# Patient Record
Sex: FEMALE | Race: WHITE | NOT HISPANIC OR LATINO | Employment: FULL TIME | URBAN - METROPOLITAN AREA
[De-identification: names, ages, dates, MRNs, and addresses within clinical notes are randomized per-mention and may not be internally consistent; named-entity substitution may affect disease eponyms.]

---

## 2020-07-02 LAB — PAP RECOMMENDATION EXT: NORMAL

## 2022-04-10 ENCOUNTER — HISTORICAL (OUTPATIENT)
Dept: ADMINISTRATIVE | Facility: HOSPITAL | Age: 27
End: 2022-04-10

## 2022-04-30 VITALS
SYSTOLIC BLOOD PRESSURE: 98 MMHG | DIASTOLIC BLOOD PRESSURE: 56 MMHG | HEIGHT: 62 IN | WEIGHT: 134 LBS | BODY MASS INDEX: 24.66 KG/M2

## 2022-11-23 ENCOUNTER — TELEPHONE (OUTPATIENT)
Dept: ENDOSCOPY | Facility: HOSPITAL | Age: 27
End: 2022-11-23
Payer: MEDICAID

## 2022-11-23 VITALS — BODY MASS INDEX: 23.55 KG/M2 | WEIGHT: 128 LBS | HEIGHT: 62 IN

## 2022-11-23 DIAGNOSIS — R10.9 ABDOMINAL PAIN, UNSPECIFIED ABDOMINAL LOCATION: Primary | ICD-10-CM

## 2022-11-23 RX ORDER — FAMOTIDINE 20 MG/1
20 TABLET, FILM COATED ORAL 2 TIMES DAILY
COMMUNITY
End: 2023-07-25

## 2022-11-23 RX ORDER — AMITRIPTYLINE HYDROCHLORIDE 25 MG/1
25 TABLET, FILM COATED ORAL NIGHTLY PRN
COMMUNITY
End: 2023-07-25

## 2022-11-23 RX ORDER — LISDEXAMFETAMINE DIMESYLATE 70 MG/1
70 CAPSULE ORAL EVERY MORNING
COMMUNITY

## 2022-11-23 RX ORDER — PANTOPRAZOLE SODIUM 40 MG/1
40 TABLET, DELAYED RELEASE ORAL DAILY
COMMUNITY
End: 2023-07-25

## 2022-11-23 RX ORDER — SUCRALFATE 1 G/1
1 TABLET ORAL 4 TIMES DAILY
COMMUNITY
End: 2023-07-25

## 2022-11-23 NOTE — PLAN OF CARE
Contacted patient in regards to scheduling esophageal manometry procedure. No answer. Left  voicemail message for patient to call the endoscopy scheduling department at [phone].

## 2022-11-23 NOTE — PLAN OF CARE
Endoscopy procedure scheduled on 12/12/22, prep instructions reviewed, Pt verbalized understanding. Prep instructions emailed to [email or web address].

## 2022-12-02 ENCOUNTER — TELEPHONE (OUTPATIENT)
Dept: ENDOSCOPY | Facility: HOSPITAL | Age: 27
End: 2022-12-02
Payer: MEDICAID

## 2022-12-02 NOTE — TELEPHONE ENCOUNTER
Spoke with Dr. Jhon Rosen's nurse Adeline at [facility]. Per Dr. Rosen patient to remain on PPI for esophageal manometry with 24 hr pH testing procedure.

## 2022-12-12 ENCOUNTER — HOSPITAL ENCOUNTER (OUTPATIENT)
Facility: HOSPITAL | Age: 27
Discharge: HOME | End: 2022-12-12
Attending: INTERNAL MEDICINE | Admitting: INTERNAL MEDICINE
Payer: MEDICAID

## 2022-12-12 VITALS
HEIGHT: 62 IN | HEART RATE: 62 BPM | RESPIRATION RATE: 15 BRPM | OXYGEN SATURATION: 100 % | TEMPERATURE: 98 F | SYSTOLIC BLOOD PRESSURE: 100 MMHG | DIASTOLIC BLOOD PRESSURE: 62 MMHG | BODY MASS INDEX: 23.55 KG/M2 | WEIGHT: 128 LBS

## 2022-12-12 DIAGNOSIS — R10.9 ABDOMINAL PAIN: ICD-10-CM

## 2022-12-12 PROCEDURE — 91038 ESOPH IMPED FUNCT TEST > 1HR: CPT | Mod: TC | Performed by: INTERNAL MEDICINE

## 2022-12-12 PROCEDURE — 25000003 PHARM REV CODE 250: Performed by: INTERNAL MEDICINE

## 2022-12-12 PROCEDURE — 91010 ESOPHAGUS MOTILITY STUDY: CPT | Mod: TC | Performed by: INTERNAL MEDICINE

## 2022-12-12 RX ORDER — LIDOCAINE HYDROCHLORIDE 20 MG/ML
JELLY TOPICAL ONCE
Status: COMPLETED | OUTPATIENT
Start: 2022-12-12 | End: 2022-12-12

## 2022-12-12 RX ADMIN — LIDOCAINE HYDROCHLORIDE 10 ML: 20 JELLY TOPICAL at 07:12

## 2022-12-12 NOTE — PLAN OF CARE
Pt tolerated procedure well. Full protocol folowed with 200 cc NS bolus.   24 hour impedance catheter placed at 33cm at 0837. Pt tolerated well and verbalized understanding of instructions.   Pt verbalized understanding of AVS

## 2022-12-15 ENCOUNTER — TELEPHONE (OUTPATIENT)
Dept: GASTROENTEROLOGY | Facility: CLINIC | Age: 27
End: 2022-12-15
Payer: MEDICAID

## 2022-12-15 PROCEDURE — 91010 PR ESOPHAGEAL MOTILITY STUDY, MA2METRY: ICD-10-PCS | Mod: 26,,, | Performed by: INTERNAL MEDICINE

## 2022-12-15 PROCEDURE — 91038 ESOPH IMPED FUNCT TEST > 1HR: CPT | Mod: 26,,, | Performed by: INTERNAL MEDICINE

## 2022-12-15 PROCEDURE — 91010 ESOPHAGUS MOTILITY STUDY: CPT | Mod: 26,,, | Performed by: INTERNAL MEDICINE

## 2022-12-15 PROCEDURE — 91038 PR GERD TST W/ MUCOS IMPEDE ELECTROD,>1HR: ICD-10-PCS | Mod: 26,,, | Performed by: INTERNAL MEDICINE

## 2022-12-15 NOTE — PROVATION PATIENT INSTRUCTIONS
Discharge Summary/Instructions after an Endoscopic Procedure  Patient Name: Mena Canas  Patient MRN: [id number]  Patient YOB: 1995  Thursday, December 15, 2022  Christen Velasquez MD  Dear patient,  As a result of recent federal legislation (The Federal Cures Act), you may   receive lab or pathology results from your procedure in your [facility]   account before your physician is able to contact you. Your physician or   their representative will relay the results to you with their   recommendations at their soonest availability.  Thank you,  RESTRICTIONS:  During your procedure today, you received medications for sedation.  These   medications may affect your judgment, balance and coordination.  Therefore,   for 24 hours, you have the following restrictions:   - DO NOT drive a car, operate machinery, make legal/financial decisions,   sign important papers or drink alcohol.    ACTIVITY:  Today: no heavy lifting, straining or running due to procedural   sedation/anesthesia.  The following day: return to full activity including work.  DIET:  Eat and drink normally unless instructed otherwise.     TREATMENT FOR COMMON SIDE EFFECTS:  - Mild abdominal pain, nausea, belching, bloating or excessive gas:  rest,   eat lightly and use a heating pad.  - Sore Throat: treat with throat lozenges and/or gargle with warm salt   water.  - Because air was used during the procedure, expelling large amounts of air   from your rectum or belching is normal.  - If a bowel prep was taken, you may not have a bowel movement for 1-3 days.    This is normal.  SYMPTOMS TO WATCH FOR AND REPORT TO YOUR PHYSICIAN:  1. Abdominal pain or bloating, other than gas cramps.  2. Chest pain.  3. Back pain.  4. Signs of infection such as: chills or fever occurring within 24 hours   after the procedure.  5. Rectal bleeding, which would show as bright red, maroon, or black stools.   (A tablespoon of blood from the rectum is not serious, especially  if   hemorrhoids are present.)  6. Vomiting.  7. Weakness or dizziness.  GO DIRECTLY TO THE NEAREST EMERGENCY ROOM IF YOU HAVE ANY OF THE FOLLOWING:      Difficulty breathing              Chills and/or fever over 101 F   Persistent vomiting and/or vomiting blood   Severe abdominal pain   Severe chest pain   Black, tarry stools   Bleeding- more than one tablespoon   Any other symptom or condition that you feel may need urgent attention  Your doctor recommends these additional instructions:  If any biopsies were taken, your doctors clinic will contact you in 1 to 2   weeks with any results.  - Return to referring physician as previously scheduled.  For questions, problems or results please call your physician - Christen Velasquez MD at Work:  [phone].  [facility], EMERGENCY ROOM PHONE NUMBER: [phone]  IF A COMPLICATION OR EMERGENCY SITUATION ARISES AND YOU ARE UNABLE TO REACH   YOUR PHYSICIAN - GO DIRECTLY TO THE EMERGENCY ROOM.  Christen Velasquez MD  12/15/2022 5:37:23 PM  This report has been verified and signed electronically.  Dear patient,  As a result of recent federal legislation (The Federal Cures Act), you may   receive lab or pathology results from your procedure in your [facility]   account before your physician is able to contact you. Your physician or   their representative will relay the results to you with their   recommendations at their soonest availability.  Thank you,  PROVATION

## 2022-12-15 NOTE — PROVATION PATIENT INSTRUCTIONS
Discharge Summary/Instructions after an Endoscopic Procedure  Patient Name: Mena Canas  Patient MRN: [id number]  Patient YOB: 1995  Thursday, December 15, 2022  Christen Velasquez MD  Dear patient,  As a result of recent federal legislation (The Federal Cures Act), you may   receive lab or pathology results from your procedure in your [facility]   account before your physician is able to contact you. Your physician or   their representative will relay the results to you with their   recommendations at their soonest availability.  Thank you,  RESTRICTIONS:  During your procedure today, you received medications for sedation.  These   medications may affect your judgment, balance and coordination.  Therefore,   for 24 hours, you have the following restrictions:   - DO NOT drive a car, operate machinery, make legal/financial decisions,   sign important papers or drink alcohol.    ACTIVITY:  Today: no heavy lifting, straining or running due to procedural   sedation/anesthesia.  The following day: return to full activity including work.  DIET:  Eat and drink normally unless instructed otherwise.     TREATMENT FOR COMMON SIDE EFFECTS:  - Mild abdominal pain, nausea, belching, bloating or excessive gas:  rest,   eat lightly and use a heating pad.  - Sore Throat: treat with throat lozenges and/or gargle with warm salt   water.  - Because air was used during the procedure, expelling large amounts of air   from your rectum or belching is normal.  - If a bowel prep was taken, you may not have a bowel movement for 1-3 days.    This is normal.  SYMPTOMS TO WATCH FOR AND REPORT TO YOUR PHYSICIAN:  1. Abdominal pain or bloating, other than gas cramps.  2. Chest pain.  3. Back pain.  4. Signs of infection such as: chills or fever occurring within 24 hours   after the procedure.  5. Rectal bleeding, which would show as bright red, maroon, or black stools.   (A tablespoon of blood from the rectum is not serious, especially  if   hemorrhoids are present.)  6. Vomiting.  7. Weakness or dizziness.  GO DIRECTLY TO THE NEAREST EMERGENCY ROOM IF YOU HAVE ANY OF THE FOLLOWING:      Difficulty breathing              Chills and/or fever over 101 F   Persistent vomiting and/or vomiting blood   Severe abdominal pain   Severe chest pain   Black, tarry stools   Bleeding- more than one tablespoon   Any other symptom or condition that you feel may need urgent attention  Your doctor recommends these additional instructions:  If any biopsies were taken, your doctors clinic will contact you in 1 to 2   weeks with any results.  - Return to referring physician as previously scheduled.  For questions, problems or results please call your physician - Christen Velasquez MD at Work:  [phone].  [facility], EMERGENCY ROOM PHONE NUMBER: [phone]  IF A COMPLICATION OR EMERGENCY SITUATION ARISES AND YOU ARE UNABLE TO REACH   YOUR PHYSICIAN - GO DIRECTLY TO THE EMERGENCY ROOM.  Christen Velasquez MD  12/15/2022 5:26:07 PM  This report has been verified and signed electronically.  Dear patient,  As a result of recent federal legislation (The Federal Cures Act), you may   receive lab or pathology results from your procedure in your [facility]   account before your physician is able to contact you. Your physician or   their representative will relay the results to you with their   recommendations at their soonest availability.  Thank you,  PROVATION

## 2023-01-24 ENCOUNTER — DOCUMENTATION ONLY (OUTPATIENT)
Dept: ADMINISTRATIVE | Facility: HOSPITAL | Age: 28
End: 2023-01-24
Payer: MEDICAID

## 2023-07-25 ENCOUNTER — OFFICE VISIT (OUTPATIENT)
Dept: OBSTETRICS AND GYNECOLOGY | Facility: CLINIC | Age: 28
End: 2023-07-25
Payer: MEDICAID

## 2023-07-25 VITALS
HEIGHT: 62 IN | WEIGHT: 126 LBS | BODY MASS INDEX: 23.19 KG/M2 | HEART RATE: 88 BPM | DIASTOLIC BLOOD PRESSURE: 60 MMHG | SYSTOLIC BLOOD PRESSURE: 110 MMHG

## 2023-07-25 DIAGNOSIS — R10.2 PELVIC PAIN: ICD-10-CM

## 2023-07-25 DIAGNOSIS — Z01.419 WOMEN'S ANNUAL ROUTINE GYNECOLOGICAL EXAMINATION: Primary | ICD-10-CM

## 2023-07-25 PROCEDURE — 1160F RVW MEDS BY RX/DR IN RCRD: CPT | Mod: CPTII,,, | Performed by: NURSE PRACTITIONER

## 2023-07-25 PROCEDURE — 3074F PR MOST RECENT SYSTOLIC BLOOD PRESSURE < 130 MM HG: ICD-10-PCS | Mod: CPTII,,, | Performed by: NURSE PRACTITIONER

## 2023-07-25 PROCEDURE — 3008F PR BODY MASS INDEX (BMI) DOCUMENTED: ICD-10-PCS | Mod: CPTII,,, | Performed by: NURSE PRACTITIONER

## 2023-07-25 PROCEDURE — 3008F BODY MASS INDEX DOCD: CPT | Mod: CPTII,,, | Performed by: NURSE PRACTITIONER

## 2023-07-25 PROCEDURE — 3078F PR MOST RECENT DIASTOLIC BLOOD PRESSURE < 80 MM HG: ICD-10-PCS | Mod: CPTII,,, | Performed by: NURSE PRACTITIONER

## 2023-07-25 PROCEDURE — 3074F SYST BP LT 130 MM HG: CPT | Mod: CPTII,,, | Performed by: NURSE PRACTITIONER

## 2023-07-25 PROCEDURE — 1159F PR MEDICATION LIST DOCUMENTED IN MEDICAL RECORD: ICD-10-PCS | Mod: CPTII,,, | Performed by: NURSE PRACTITIONER

## 2023-07-25 PROCEDURE — 99395 PR PREVENTIVE VISIT,EST,18-39: ICD-10-PCS | Mod: ,,, | Performed by: NURSE PRACTITIONER

## 2023-07-25 PROCEDURE — 1159F MED LIST DOCD IN RCRD: CPT | Mod: CPTII,,, | Performed by: NURSE PRACTITIONER

## 2023-07-25 PROCEDURE — 1160F PR REVIEW ALL MEDS BY PRESCRIBER/CLIN PHARMACIST DOCUMENTED: ICD-10-PCS | Mod: CPTII,,, | Performed by: NURSE PRACTITIONER

## 2023-07-25 PROCEDURE — 3078F DIAST BP <80 MM HG: CPT | Mod: CPTII,,, | Performed by: NURSE PRACTITIONER

## 2023-07-25 PROCEDURE — 99395 PREV VISIT EST AGE 18-39: CPT | Mod: ,,, | Performed by: NURSE PRACTITIONER

## 2023-07-25 RX ORDER — ONDANSETRON 8 MG/1
TABLET, ORALLY DISINTEGRATING ORAL
COMMUNITY
Start: 2023-06-08

## 2023-07-25 RX ORDER — RIZATRIPTAN BENZOATE 10 MG/1
TABLET ORAL
COMMUNITY
Start: 2023-06-08

## 2023-07-25 RX ORDER — PROMETHAZINE HYDROCHLORIDE 25 MG/1
25 TABLET ORAL 2 TIMES DAILY PRN
COMMUNITY
Start: 2023-07-11

## 2023-07-25 NOTE — PROGRESS NOTES
Patient ID: [id number]   Chief Complaint: Annual exam  Chief Complaint   Patient presents with    Annual Exam     C/O PELVIC PAIN,NAUSEA AND BLOATING.RECENTLY WAS SEEN BY GENERAL SURGEON AND STATES ALL TEST WERE NEG,AND THOUGHT MY BE EDOMETRIOSIS.     HPI:   Mena Canas is a 28 y.o. year old  here for her Annual Exam. No LMP recorded. Patient has had a hysterectomy. She is doing well. Denies any health changes. Annual Exam (C/O PELVIC PAIN,NAUSEA AND BLOATING.RECENTLY WAS SEEN BY GENERAL SURGEON AND STATES ALL TEST WERE NEG,AND THOUGHT MY BE EDOMETRIOSIS.)    Subjective:     Past Medical History:   Diagnosis Date    ADHD     Endometriosis, unspecified     Fibroids     Migraines     Other constipation      Past Surgical History:   Procedure Laterality Date    24 HOUR IMPEDANCE PH MONITORING OF ESOPHAGUS IN PATIENT NOT TAKING ACID REDUCING MEDICATIONS N/A 2022    Procedure: IMPEDANCE PH STUDY, ESOPHAGEAL, 24 HOUR, IN PATIENT NOT TAKING ACID REDUCING MEDICATION;  Surgeon: Christen Velasquez MD;  Location: [facility] ([address] [place] [address]);  Service: Endoscopy;  Laterality: N/A;    ENDOMETRIAL ABLATION      ESOPHAGEAL MANOMETRY WITH MEASUREMENT OF IMPEDANCE N/A 2022    Procedure: MANOMETRY, ESOPHAGUS, WITH IMPEDANCE MEASUREMENT;  Surgeon: Christen Velasquez MD;  Location: [facility] ([address] [place] [address]);  Service: Endoscopy;  Laterality: N/A;  outside referral to Dr. Velasquez from Dr. Jhon Rosen-see media tab 22  Per Dr. Jhon Rosen patient to be ON PPI   instructions emailed to [email or web address]    TUBAL LIGATION      TUBES IN EARS      AS CHILD    VAGINAL HYSTERECTOMY      WITH OUT BS&O    WISDOM TOOTH EXTRACTION       Social History     Tobacco Use    Smoking status: Never    Smokeless tobacco: Never   Substance Use Topics    Alcohol use: Yes     Comment: RARELY    Drug use: Never     Family History   Problem Relation Age of Onset    Atrial fibrillation Mother      OB History    Para Term  AB  "Living   2 2 2     2   SAB IAB Ectopic Multiple Live Births           2      # Outcome Date GA Lbr Kartik/2nd Weight Sex Delivery Anes PTL Lv   2 Term 11/15/16   4.02 kg (8 lb 13.8 oz) M Vag-Spont None  KARSON   1 Term 08/28/12   3.629 kg (8 lb) M Vag-Spont   KARSON       Current Outpatient Medications:     lisdexamfetamine (VYVANSE) 70 MG capsule, Take 70 mg by mouth every morning., Disp: , Rfl:     ondansetron (ZOFRAN-ODT) 8 MG TbDL, Take by mouth., Disp: , Rfl:     promethazine (PHENERGAN) 25 MG tablet, Take 25 mg by mouth 2 (two) times daily as needed., Disp: , Rfl:     rizatriptan (MAXALT) 10 MG tablet, Take by mouth., Disp: , Rfl:     amitriptyline (ELAVIL) 25 MG tablet, Take 25 mg by mouth nightly as needed for Insomnia., Disp: , Rfl:     famotidine (PEPCID) 20 MG tablet, Take 20 mg by mouth 2 (two) times daily., Disp: , Rfl:     linaCLOtide (LINZESS) 72 mcg Cap capsule, Take 72 mcg by mouth before breakfast., Disp: , Rfl:     pantoprazole (PROTONIX) 40 MG tablet, Take 40 mg by mouth once daily., Disp: , Rfl:     sucralfate (CARAFATE) 1 gram tablet, Take 1 g by mouth 4 (four) times daily., Disp: , Rfl:     PAP:  Last PAP: 7/2/2020    History of Abnormal PAP Smear: NO  HPV Vaccine: YES: AS TEEN     INTERCOURSE:  Dyspareunia: Yes  Postcoital Bleeding: No  History of STI: No  Current Birth Control Method: tubal ligation/HYST  Sexually Active: yes  BREAST HISTORY:   Last Mammogram: N/A  MENOPAUSE:  Post Menopausal Bleeding: No  Hormone Replacement Therapy: No  COLONOSCOPY:  Last Colonoscopy: 2023 WNL          Review of Systems 12 point review of systems conducted, negative except as stated in the history of present illness. See HPI for details.  Objective:   Visit Vitals  /60   Pulse 88   Ht 5' 2" (1.575 m)   Wt 57.2 kg (126 lb)   BMI 23.05 kg/m²     Physical Exam:  Physical Exam  Constitutional:  General Appearance : alert, in no acute distress, normal, well nourished.  Respiratory:  Respiratory Effort: " normal.  Breast:  Right: Inspection/palpation: no discharge, no masses present, no nipple retraction, no skin changes, no skin dimpling, no tenderness, no lymphadenopathy, no axillary mass, no axillary tenderness.  Left: Inspection/palpation: no discharge, no masses present, no nipple retraction, no skin changes, no skin dimpling, no tenderness, no lymphadenopathy, no axillary mass, no axillary tenderness.  Gastrointestinal:  Abdomen: no masses. no tender, nondistended.  Liver and spleen: normal  Hernias: no hernias present, no inguinal adenopathy.  Genitourinary:  External Genitalia: normal, no lesions.  Vagina: normal appearance, no abnormal discharge, no lesions.  Bladder: no mass, nontender.  Urethra: no erythema or lesions present.  Anus and Perineum: visually normal.   Chaperone Present  No results found for this or any previous visit (from the past 24 hour(s)).  Assessment/Plan:   Assessment:   Women's annual routine gynecological examination  -     MDL Sendout Test    Pelvic pain  -     US Pelvis Limited Non OB; Future; Expected date: 07/25/2023      Follow up in about 3 weeks (around 8/15/2023) for RESULTS. In addition to their scheduled FU, the patient has also been instructed to follow up on as needed basis. All questions were answered and the patient voiced understanding of the above issues.

## 2023-08-17 ENCOUNTER — OFFICE VISIT (OUTPATIENT)
Dept: OBSTETRICS AND GYNECOLOGY | Facility: CLINIC | Age: 28
End: 2023-08-17
Payer: MEDICAID

## 2023-08-17 VITALS
DIASTOLIC BLOOD PRESSURE: 60 MMHG | WEIGHT: 125 LBS | HEIGHT: 63 IN | HEART RATE: 78 BPM | SYSTOLIC BLOOD PRESSURE: 104 MMHG | BODY MASS INDEX: 22.15 KG/M2

## 2023-08-17 DIAGNOSIS — R10.2 PELVIC PAIN: Primary | ICD-10-CM

## 2023-08-17 PROCEDURE — 3078F DIAST BP <80 MM HG: CPT | Mod: CPTII,,, | Performed by: OBSTETRICS & GYNECOLOGY

## 2023-08-17 PROCEDURE — 3074F PR MOST RECENT SYSTOLIC BLOOD PRESSURE < 130 MM HG: ICD-10-PCS | Mod: CPTII,,, | Performed by: OBSTETRICS & GYNECOLOGY

## 2023-08-17 PROCEDURE — 3008F BODY MASS INDEX DOCD: CPT | Mod: CPTII,,, | Performed by: OBSTETRICS & GYNECOLOGY

## 2023-08-17 PROCEDURE — 99213 PR OFFICE/OUTPT VISIT, EST, LEVL III, 20-29 MIN: ICD-10-PCS | Mod: ,,, | Performed by: OBSTETRICS & GYNECOLOGY

## 2023-08-17 PROCEDURE — 3078F PR MOST RECENT DIASTOLIC BLOOD PRESSURE < 80 MM HG: ICD-10-PCS | Mod: CPTII,,, | Performed by: OBSTETRICS & GYNECOLOGY

## 2023-08-17 PROCEDURE — 3008F PR BODY MASS INDEX (BMI) DOCUMENTED: ICD-10-PCS | Mod: CPTII,,, | Performed by: OBSTETRICS & GYNECOLOGY

## 2023-08-17 PROCEDURE — 1160F PR REVIEW ALL MEDS BY PRESCRIBER/CLIN PHARMACIST DOCUMENTED: ICD-10-PCS | Mod: CPTII,,, | Performed by: OBSTETRICS & GYNECOLOGY

## 2023-08-17 PROCEDURE — 1160F RVW MEDS BY RX/DR IN RCRD: CPT | Mod: CPTII,,, | Performed by: OBSTETRICS & GYNECOLOGY

## 2023-08-17 PROCEDURE — 1159F PR MEDICATION LIST DOCUMENTED IN MEDICAL RECORD: ICD-10-PCS | Mod: CPTII,,, | Performed by: OBSTETRICS & GYNECOLOGY

## 2023-08-17 PROCEDURE — 99213 OFFICE O/P EST LOW 20 MIN: CPT | Mod: ,,, | Performed by: OBSTETRICS & GYNECOLOGY

## 2023-08-17 PROCEDURE — 1159F MED LIST DOCD IN RCRD: CPT | Mod: CPTII,,, | Performed by: OBSTETRICS & GYNECOLOGY

## 2023-08-17 PROCEDURE — 3074F SYST BP LT 130 MM HG: CPT | Mod: CPTII,,, | Performed by: OBSTETRICS & GYNECOLOGY

## 2023-08-17 NOTE — PROGRESS NOTES
Patient ID: [id number]   Chief Complaint: Follow-up (Pelvic US results. C/O ongoing pelvic pain)    HPI:     Mena Canas is a 28 y.o.  here today for Follow-up Pelvic US results. C/O ongoing pelvic pain.   No LMP recorded. Patient has had a hysterectomy.  PELVIC U/S WAS NORMAL.  DISCUSSED OPTIONS FOR CARE, WOULD LIKE TO PROCEED WITH DX LAPARSCOPY.    Past Medical History:  has a past medical history of ADHD, Endometriosis, unspecified, Fibroids, Migraines, and Other constipation.    Surgical History:  has a past surgical history that includes Esophageal manometry with measurement of impedance (N/A, 2022); 24 hour impedance pH monitoring of esophagus in patient not taking acid reducing medications (N/A, 2022); Vaginal hysterectomy; Tubal ligation; TUBES IN EARS; [facility] [place] tooth extraction; and Endometrial ablation.    Family History: family history includes Atrial fibrillation in her mother.    Social History:  reports that she has never smoked. She has never used smokeless tobacco. She reports current alcohol use. She reports that she does not use drugs.    Current Outpatient Medications   Medication Sig Dispense Refill    lisdexamfetamine (VYVANSE) 70 MG capsule Take 70 mg by mouth every morning.      ondansetron (ZOFRAN-ODT) 8 MG TbDL Take by mouth.      promethazine (PHENERGAN) 25 MG tablet Take 25 mg by mouth 2 (two) times daily as needed.      rizatriptan (MAXALT) 10 MG tablet Take by mouth.       No current facility-administered medications for this visit.       Patient is allergic to tobramycin.     MENARCHEAL:    PAP:  Last PAP: 2020    History of Abnormal PAP Smear: NO  INTERCOURSE:  Dyspareunia: No  Postcoital Bleeding: No  History of STI: No  Current Birth Control Method: status post hysterectomy  Sexually Active: yes      No results found for this or any previous visit (from the past 24 hour(s)).    Subjective:     Review of Systems    12 point review of systems conducted, negative  "except as stated in the history of present illness. See HPI for details.    Objective:     Visit Vitals  /60   Pulse 78   Ht 5' 3" (1.6 m)   Wt 56.7 kg (125 lb)   BMI 22.14 kg/m²       Physical Exam  Constitutional:  General Appearance : alert, in no acute distress, normal, well nourished.  Neck/Thyroid:  Inspection/Palpation: normal. Thyroid: normal size and shape.  Respiratory:  Respiratory Effort: normal.  Gastrointestinal:  Abdomen: no masses. no tender, nondistended.  Liver and spleen: normal  Hernias: no hernias present, no inguinal adenopathy.  Genitourinary:  External Genitalia: normal, no lesions.  Vagina: normal appearance, no abnormal discharge, no lesions.  Bladder: no mass, nontender.  Urethra: no erythema or lesions present.  Cervix: no lesions, non tender.   Uterus: nontender, normal contour, normal mobility, normal size.   Adnexa: no masses, no tenderness.  Anus and Perineum: visually normal.   Chaperone Present  Assessment:       ICD-10-CM ICD-9-CM   1. Pelvic pain  R10.2 [id number]     Plan   Pelvic pain  -     Case Request Operating Room: LAPAROSCOPY, DIAGNOSTIC, WITH LASER PROCEDURE        Follow up in 19 days (on 9/5/2023) for PRE-OP. In addition to their scheduled follow up, the patient has also been instructed to follow up on as needed basis.     MIGUELINA HAN MD    "

## 2023-09-05 ENCOUNTER — HOSPITAL ENCOUNTER (OUTPATIENT)
Dept: PREADMISSION TESTING | Facility: HOSPITAL | Age: 28
Discharge: HOME | End: 2023-09-05
Attending: OBSTETRICS & GYNECOLOGY
Payer: MEDICAID

## 2023-09-05 ENCOUNTER — OFFICE VISIT (OUTPATIENT)
Dept: OBSTETRICS AND GYNECOLOGY | Facility: CLINIC | Age: 28
End: 2023-09-05
Payer: MEDICAID

## 2023-09-05 VITALS — HEIGHT: 63 IN | WEIGHT: 130 LBS | BODY MASS INDEX: 23.04 KG/M2

## 2023-09-05 VITALS
HEART RATE: 80 BPM | WEIGHT: 130.19 LBS | BODY MASS INDEX: 23.07 KG/M2 | DIASTOLIC BLOOD PRESSURE: 60 MMHG | HEIGHT: 63 IN | SYSTOLIC BLOOD PRESSURE: 120 MMHG

## 2023-09-05 DIAGNOSIS — R10.2 PELVIC PAIN: Primary | ICD-10-CM

## 2023-09-05 LAB
ABO AND RH: NORMAL
ALBUMIN SERPL-MCNC: 4.5 G/DL (ref 3.4–5)
ALBUMIN/GLOB SERPL: 1.6 RATIO
ALP SERPL-CCNC: 45 UNIT/L (ref 50–144)
ALT SERPL-CCNC: 16 UNIT/L (ref 1–45)
ANION GAP SERPL CALC-SCNC: 7 MEQ/L (ref 2–13)
ANTIBODY SCREEN: NORMAL
APPEARANCE UR: CLEAR
APTT PPP: 25.6 SECONDS (ref 23–29.4)
AST SERPL-CCNC: 26 UNIT/L (ref 14–36)
BASOPHILS # BLD AUTO: 0.04 X10(3)/MCL (ref 0.01–0.08)
BASOPHILS NFR BLD AUTO: 0.6 % (ref 0.1–1.2)
BILIRUB SERPL-MCNC: 0.6 MG/DL (ref 0–1)
BILIRUB UR QL STRIP.AUTO: NEGATIVE
BUN SERPL-MCNC: 16 MG/DL (ref 7–20)
CALCIUM SERPL-MCNC: 9.1 MG/DL (ref 8.4–10.2)
CHLORIDE SERPL-SCNC: 101 MMOL/L (ref 98–110)
CO2 SERPL-SCNC: 29 MMOL/L (ref 21–32)
COLOR UR: YELLOW
CREAT SERPL-MCNC: 0.69 MG/DL (ref 0.66–1.25)
CREAT/UREA NIT SERPL: 23 (ref 12–20)
EOSINOPHIL # BLD AUTO: 0.04 X10(3)/MCL (ref 0.04–0.36)
EOSINOPHIL NFR BLD AUTO: 0.6 % (ref 0.7–7)
ERYTHROCYTE [DISTWIDTH] IN BLOOD BY AUTOMATED COUNT: 12.2 % (ref 11–14.5)
GFR SERPLBLD CREATININE-BSD FMLA CKD-EPI: >90 MLS/MIN/1.73/M2
GLOBULIN SER-MCNC: 2.8 GM/DL (ref 2–3.9)
GLUCOSE SERPL-MCNC: 92 MG/DL (ref 70–115)
GLUCOSE UR QL STRIP.AUTO: NEGATIVE
HCT VFR BLD AUTO: 39.4 % (ref 36–48)
HGB BLD-MCNC: 13.1 G/DL (ref 11.8–16)
IMM GRANULOCYTES # BLD AUTO: 0.04 X10(3)/MCL (ref 0–0.03)
IMM GRANULOCYTES NFR BLD AUTO: 0.6 % (ref 0–0.5)
INR PPP: 0.9
KETONES UR QL STRIP.AUTO: NEGATIVE
LEUKOCYTE ESTERASE UR QL STRIP.AUTO: NEGATIVE
LYMPHOCYTES # BLD AUTO: 2.12 X10(3)/MCL (ref 1.16–3.74)
LYMPHOCYTES NFR BLD AUTO: 32.5 % (ref 20–55)
MCH RBC QN AUTO: 30.1 PG (ref 27–34)
MCHC RBC AUTO-ENTMCNC: 33.2 G/DL (ref 31–37)
MCV RBC AUTO: 90.6 FL (ref 79–99)
MONOCYTES # BLD AUTO: 0.56 X10(3)/MCL (ref 0.24–0.36)
MONOCYTES NFR BLD AUTO: 8.6 % (ref 4.7–12.5)
NEUTROPHILS # BLD AUTO: 3.72 X10(3)/MCL (ref 1.56–6.13)
NEUTROPHILS NFR BLD AUTO: 57.1 % (ref 37–73)
NITRITE UR QL STRIP.AUTO: NEGATIVE
NRBC BLD AUTO-RTO: 0 %
PH UR STRIP.AUTO: 6.5 [PH]
PLATELET # BLD AUTO: 205 X10(3)/MCL (ref 140–371)
PMV BLD AUTO: 8.9 FL (ref 9.4–12.4)
POTASSIUM SERPL-SCNC: 4.3 MMOL/L (ref 3.5–5.1)
PROT SERPL-MCNC: 7.3 GM/DL (ref 6.3–8.2)
PROT UR QL STRIP.AUTO: NEGATIVE
PROTHROMBIN TIME: 9.3 SECONDS (ref 9.3–11.9)
RBC # BLD AUTO: 4.35 X10(6)/MCL (ref 4–5.1)
RBC UR QL AUTO: NEGATIVE
SODIUM SERPL-SCNC: 137 MMOL/L (ref 135–145)
SP GR UR STRIP.AUTO: <=1.005 (ref 1–1.03)
SPECIMEN OUTDATE: NORMAL
UROBILINOGEN UR STRIP-ACNC: 0.2
WBC # SPEC AUTO: 6.52 X10(3)/MCL (ref 4–11.5)

## 2023-09-05 PROCEDURE — 3078F PR MOST RECENT DIASTOLIC BLOOD PRESSURE < 80 MM HG: ICD-10-PCS | Mod: CPTII,,, | Performed by: OBSTETRICS & GYNECOLOGY

## 2023-09-05 PROCEDURE — 36415 COLL VENOUS BLD VENIPUNCTURE: CPT | Performed by: OBSTETRICS & GYNECOLOGY

## 2023-09-05 PROCEDURE — 1159F MED LIST DOCD IN RCRD: CPT | Mod: CPTII,,, | Performed by: OBSTETRICS & GYNECOLOGY

## 2023-09-05 PROCEDURE — 1160F PR REVIEW ALL MEDS BY PRESCRIBER/CLIN PHARMACIST DOCUMENTED: ICD-10-PCS | Mod: CPTII,,, | Performed by: OBSTETRICS & GYNECOLOGY

## 2023-09-05 PROCEDURE — 3008F BODY MASS INDEX DOCD: CPT | Mod: CPTII,,, | Performed by: OBSTETRICS & GYNECOLOGY

## 2023-09-05 PROCEDURE — 1159F PR MEDICATION LIST DOCUMENTED IN MEDICAL RECORD: ICD-10-PCS | Mod: CPTII,,, | Performed by: OBSTETRICS & GYNECOLOGY

## 2023-09-05 PROCEDURE — 3074F SYST BP LT 130 MM HG: CPT | Mod: CPTII,,, | Performed by: OBSTETRICS & GYNECOLOGY

## 2023-09-05 PROCEDURE — 81003 URINALYSIS AUTO W/O SCOPE: CPT | Performed by: OBSTETRICS & GYNECOLOGY

## 2023-09-05 PROCEDURE — 3008F PR BODY MASS INDEX (BMI) DOCUMENTED: ICD-10-PCS | Mod: CPTII,,, | Performed by: OBSTETRICS & GYNECOLOGY

## 2023-09-05 PROCEDURE — 3074F PR MOST RECENT SYSTOLIC BLOOD PRESSURE < 130 MM HG: ICD-10-PCS | Mod: CPTII,,, | Performed by: OBSTETRICS & GYNECOLOGY

## 2023-09-05 PROCEDURE — 1160F RVW MEDS BY RX/DR IN RCRD: CPT | Mod: CPTII,,, | Performed by: OBSTETRICS & GYNECOLOGY

## 2023-09-05 PROCEDURE — 3078F DIAST BP <80 MM HG: CPT | Mod: CPTII,,, | Performed by: OBSTETRICS & GYNECOLOGY

## 2023-09-05 PROCEDURE — 99213 OFFICE O/P EST LOW 20 MIN: CPT | Mod: ,,, | Performed by: OBSTETRICS & GYNECOLOGY

## 2023-09-05 PROCEDURE — 99213 PR OFFICE/OUTPT VISIT, EST, LEVL III, 20-29 MIN: ICD-10-PCS | Mod: ,,, | Performed by: OBSTETRICS & GYNECOLOGY

## 2023-09-05 PROCEDURE — 86900 BLOOD TYPING SEROLOGIC ABO: CPT | Performed by: OBSTETRICS & GYNECOLOGY

## 2023-09-05 RX ORDER — SODIUM CHLORIDE, SODIUM LACTATE, POTASSIUM CHLORIDE, CALCIUM CHLORIDE 600; 310; 30; 20 MG/100ML; MG/100ML; MG/100ML; MG/100ML
INJECTION, SOLUTION INTRAVENOUS CONTINUOUS
Status: CANCELLED | OUTPATIENT
Start: 2023-09-05

## 2023-09-05 RX ORDER — FAMOTIDINE 20 MG/1
20 TABLET, FILM COATED ORAL
Status: SHIPPED | OUTPATIENT
Start: 2023-09-05

## 2023-09-05 NOTE — H&P (VIEW-ONLY)
Patient ID: [id number]   Chief Complaint: PRE-OP  Chief Complaint   Patient presents with    PRE-OP     PT PRESENTS TO CLINIC FOR PRE-OP LAPAROSCOPY, DIAGNOSTIC      HPI:   Mena Canas is a 28 y.o. year old  who presents to clinic today for pre-op diagnostic laparoscopy. Procedure discussed in detail as well as risks, benefits, and alternatives. Questions answered and consents signed. Instructed for NPO after midnight. Bowel prep Yes PRE-OP.  PT VERBALIZES UNDERSTANDING.   DISCUSSED POSSIBILITY OF REMOVING OVARY OR OVARIES/TUBES. DISCUSSED FULGURATION OF ENDOMETRIOSIS.    Subjective:     Past Medical History:   Diagnosis Date    ADHD     Endometriosis, unspecified     Fibroids     Migraines     Other constipation      Past Surgical History:   Procedure Laterality Date    24 HOUR IMPEDANCE PH MONITORING OF ESOPHAGUS IN PATIENT NOT TAKING ACID REDUCING MEDICATIONS N/A 2022    Procedure: IMPEDANCE PH STUDY, ESOPHAGEAL, 24 HOUR, IN PATIENT NOT TAKING ACID REDUCING MEDICATION;  Surgeon: Christen Velasquez MD;  Location: [facility] ([address] [place] [address]);  Service: Endoscopy;  Laterality: N/A;    ENDOMETRIAL ABLATION      ESOPHAGEAL MANOMETRY WITH MEASUREMENT OF IMPEDANCE N/A 2022    Procedure: MANOMETRY, ESOPHAGUS, WITH IMPEDANCE MEASUREMENT;  Surgeon: Christen Velasquez MD;  Location: [facility] ([address] [place] [address]);  Service: Endoscopy;  Laterality: N/A;  outside referral to Dr. Velasquez from Dr. Jhon Rosen-see media tab 22  Per Dr. Jhon Rosen patient to be ON PPI   instructions emailed to [email or web address]    TUBAL LIGATION      TUBES IN EARS      AS CHILD    VAGINAL HYSTERECTOMY      WITH OUT BS&O    WISDOM TOOTH EXTRACTION       Social History     Tobacco Use    Smoking status: Never    Smokeless tobacco: Never   Substance Use Topics    Alcohol use: Yes     Comment: RARELY    Drug use: Never     Family History   Problem Relation Age of Onset    Atrial fibrillation Mother      OB History    Para Term  " AB Living   2 2 2     2   SAB IAB Ectopic Multiple Live Births           2      # Outcome Date GA Lbr Kartik/2nd Weight Sex Delivery Anes PTL Lv   2 Term 11/15/16   4.02 kg (8 lb 13.8 oz) M Vag-Spont None  KARSON   1 Term 12   3.629 kg (8 lb) M Vag-Spont   KARSON       Current Outpatient Medications:     ondansetron (ZOFRAN-ODT) 8 MG TbDL, Take by mouth., Disp: , Rfl:     promethazine (PHENERGAN) 25 MG tablet, Take 25 mg by mouth 2 (two) times daily as needed., Disp: , Rfl:     rizatriptan (MAXALT) 10 MG tablet, Take by mouth., Disp: , Rfl:     lisdexamfetamine (VYVANSE) 70 MG capsule, Take 70 mg by mouth every morning., Disp: , Rfl:     Current Facility-Administered Medications:     famotidine tablet 20 mg, 20 mg, Oral, On Call Procedure, Ishmael Coleman MD    MENARCHEAL:  Cycle Length: TVH W/O BSO  Dysmenorrhea: No  Intermenstrual Bleeding: No  PAP:  Last PAP: 2020    History of Abnormal PAP Smear: NO  Treated: N/A  HPV Vaccine: NO  INTERCOURSE:  Dyspareunia: No  Postcoital Bleeding: No  History of STI: No   If yes, then: No   Current Birth Control Method: tubal ligation AND TVH W/O BSO  Sexually Active: yes  COLONOSCOPY:  Last Colonoscopy:       Review of Systems 12 point review of systems conducted, negative except as stated in the history of present illness. See HPI for details.    Objective:   Visit Vitals  /60   Pulse 80   Ht 5' 3" (1.6 m)   Wt 59.1 kg (130 lb 2.9 oz)   BMI 23.06 kg/m²     Physical Exam:  Physical Exam  Constitutional:  General Appearance : alert, in no acute distress, normal, well nourished.  Respiratory:  Respiratory Effort: normal.  Gastrointestinal:  Abdomen: no masses. no tender, nondistended.  Liver and spleen: normal  Hernias: no hernias present, no inguinal adenopathy.  Genitourinary:  External Genitalia: normal, no lesions.  Vagina: normal appearance, no abnormal discharge, no lesions.  Bladder: no mass, nontender.  Urethra: no erythema or lesions " present.  Cervix: SURGICALLY ABSENT  Uterus: SURGICALLY ABSENT  Adnexa: no masses, no tenderness.  Anus and Perineum: visually normal.   Chaperone Present    No results found for this or any previous visit (from the past 24 hour(s)).  Assessment/Plan:   Assessment:   Pelvic pain    Other orders  -     Place in Outpatient; Standing  -     Full code; Standing  -     Vital signs; Standing  -     Pulse Oximetry Q Shift; Standing  -     Bed rest with bathroom privileges; Standing  -     Insert peripheral IV; Standing  -     Thompson to Gravity; Standing  -     Notify Physician/Vital Signs Parameters Urine output less than 0.5mL/kg/hr (with indwelling catheter) or 30 mL/hr (without indwelling catheter) or blood glucose greater than 200 mg/dL; Standing  -     Notify physician; Standing  -     Notify Physician - Potential Need of Opioid Reversal; Standing  -     lactated ringers infusion  -     IP VTE LOW RISK PATIENT; Standing  -     famotidine tablet 20 mg  -     Cancel: Urinalysis, Reflex to Urine Culture; Standing  -     Diet NPO; Standing  -     Place sequential compression device; Standing  -     ceFAZolin (ANCEF) 2 g in dextrose 5 % (D5W) 50 mL IVPB  -     Comprehensive metabolic panel; Standing  -     CBC auto differential; Standing  -     Protime-INR; Standing  -     APTT; Standing  -     Cancel: Pregnancy, urine rapid; Standing  -     Cancel: Type & Screen Pre Op; Standing    Follow up in about 2 weeks (around 9/19/2023) for POST-OP. In addition to their scheduled FU, the patient has also been instructed to follow up on as needed basis.    All questions were answered and the patient voiced understanding of the above issues.

## 2023-09-05 NOTE — PROGRESS NOTES
Patient ID: [id number]   Chief Complaint: PRE-OP  Chief Complaint   Patient presents with    PRE-OP     PT PRESENTS TO CLINIC FOR PRE-OP LAPAROSCOPY, DIAGNOSTIC      HPI:   Mena Canas is a 28 y.o. year old  who presents to clinic today for pre-op diagnostic laparoscopy. Procedure discussed in detail as well as risks, benefits, and alternatives. Questions answered and consents signed. Instructed for NPO after midnight. Bowel prep Yes PRE-OP.  PT VERBALIZES UNDERSTANDING.   DISCUSSED POSSIBILITY OF REMOVING OVARY OR OVARIES/TUBES. DISCUSSED FULGURATION OF ENDOMETRIOSIS.    Subjective:     Past Medical History:   Diagnosis Date    ADHD     Endometriosis, unspecified     Fibroids     Migraines     Other constipation      Past Surgical History:   Procedure Laterality Date    24 HOUR IMPEDANCE PH MONITORING OF ESOPHAGUS IN PATIENT NOT TAKING ACID REDUCING MEDICATIONS N/A 2022    Procedure: IMPEDANCE PH STUDY, ESOPHAGEAL, 24 HOUR, IN PATIENT NOT TAKING ACID REDUCING MEDICATION;  Surgeon: Christen Velasquez MD;  Location: [facility] ([address] [place] [address]);  Service: Endoscopy;  Laterality: N/A;    ENDOMETRIAL ABLATION      ESOPHAGEAL MANOMETRY WITH MEASUREMENT OF IMPEDANCE N/A 2022    Procedure: MANOMETRY, ESOPHAGUS, WITH IMPEDANCE MEASUREMENT;  Surgeon: Christen Velasquez MD;  Location: [facility] ([address] [place] [address]);  Service: Endoscopy;  Laterality: N/A;  outside referral to Dr. Velasquez from Dr. Jhon Rosen-see media tab 22  Per Dr. Jhon Rosen patient to be ON PPI   instructions emailed to [email or web address]    TUBAL LIGATION      TUBES IN EARS      AS CHILD    VAGINAL HYSTERECTOMY      WITH OUT BS&O    WISDOM TOOTH EXTRACTION       Social History     Tobacco Use    Smoking status: Never    Smokeless tobacco: Never   Substance Use Topics    Alcohol use: Yes     Comment: RARELY    Drug use: Never     Family History   Problem Relation Age of Onset    Atrial fibrillation Mother      OB History    Para Term  " AB Living   2 2 2     2   SAB IAB Ectopic Multiple Live Births           2      # Outcome Date GA Lbr Kartik/2nd Weight Sex Delivery Anes PTL Lv   2 Term 11/15/16   4.02 kg (8 lb 13.8 oz) M Vag-Spont None  KARSON   1 Term 12   3.629 kg (8 lb) M Vag-Spont   KARSON       Current Outpatient Medications:     ondansetron (ZOFRAN-ODT) 8 MG TbDL, Take by mouth., Disp: , Rfl:     promethazine (PHENERGAN) 25 MG tablet, Take 25 mg by mouth 2 (two) times daily as needed., Disp: , Rfl:     rizatriptan (MAXALT) 10 MG tablet, Take by mouth., Disp: , Rfl:     lisdexamfetamine (VYVANSE) 70 MG capsule, Take 70 mg by mouth every morning., Disp: , Rfl:     Current Facility-Administered Medications:     famotidine tablet 20 mg, 20 mg, Oral, On Call Procedure, Ishmael Coleman MD    MENARCHEAL:  Cycle Length: TVH W/O BSO  Dysmenorrhea: No  Intermenstrual Bleeding: No  PAP:  Last PAP: 2020    History of Abnormal PAP Smear: NO  Treated: N/A  HPV Vaccine: NO  INTERCOURSE:  Dyspareunia: No  Postcoital Bleeding: No  History of STI: No   If yes, then: No   Current Birth Control Method: tubal ligation AND TVH W/O BSO  Sexually Active: yes  COLONOSCOPY:  Last Colonoscopy:       Review of Systems 12 point review of systems conducted, negative except as stated in the history of present illness. See HPI for details.    Objective:   Visit Vitals  /60   Pulse 80   Ht 5' 3" (1.6 m)   Wt 59.1 kg (130 lb 2.9 oz)   BMI 23.06 kg/m²     Physical Exam:  Physical Exam  Constitutional:  General Appearance : alert, in no acute distress, normal, well nourished.  Respiratory:  Respiratory Effort: normal.  Gastrointestinal:  Abdomen: no masses. no tender, nondistended.  Liver and spleen: normal  Hernias: no hernias present, no inguinal adenopathy.  Genitourinary:  External Genitalia: normal, no lesions.  Vagina: normal appearance, no abnormal discharge, no lesions.  Bladder: no mass, nontender.  Urethra: no erythema or lesions " present.  Cervix: SURGICALLY ABSENT  Uterus: SURGICALLY ABSENT  Adnexa: no masses, no tenderness.  Anus and Perineum: visually normal.   Chaperone Present    No results found for this or any previous visit (from the past 24 hour(s)).  Assessment/Plan:   Assessment:   Pelvic pain    Other orders  -     Place in Outpatient; Standing  -     Full code; Standing  -     Vital signs; Standing  -     Pulse Oximetry Q Shift; Standing  -     Bed rest with bathroom privileges; Standing  -     Insert peripheral IV; Standing  -     Thompson to Gravity; Standing  -     Notify Physician/Vital Signs Parameters Urine output less than 0.5mL/kg/hr (with indwelling catheter) or 30 mL/hr (without indwelling catheter) or blood glucose greater than 200 mg/dL; Standing  -     Notify physician; Standing  -     Notify Physician - Potential Need of Opioid Reversal; Standing  -     lactated ringers infusion  -     IP VTE LOW RISK PATIENT; Standing  -     famotidine tablet 20 mg  -     Cancel: Urinalysis, Reflex to Urine Culture; Standing  -     Diet NPO; Standing  -     Place sequential compression device; Standing  -     ceFAZolin (ANCEF) 2 g in dextrose 5 % (D5W) 50 mL IVPB  -     Comprehensive metabolic panel; Standing  -     CBC auto differential; Standing  -     Protime-INR; Standing  -     APTT; Standing  -     Cancel: Pregnancy, urine rapid; Standing  -     Cancel: Type & Screen Pre Op; Standing    Follow up in about 2 weeks (around 9/19/2023) for POST-OP. In addition to their scheduled FU, the patient has also been instructed to follow up on as needed basis.    All questions were answered and the patient voiced understanding of the above issues.

## 2023-09-05 NOTE — DISCHARGE INSTRUCTIONS

## 2023-09-06 ENCOUNTER — ANESTHESIA EVENT (OUTPATIENT)
Dept: SURGERY | Facility: HOSPITAL | Age: 28
End: 2023-09-06
Payer: MEDICAID

## 2023-09-06 NOTE — ANESTHESIA PREPROCEDURE EVALUATION
09/06/2023  Mena Canas is a 28 y.o., female.      Pre-op Assessment    I have reviewed the Patient Summary Reports.     I have reviewed the Nursing Notes. I have reviewed the NPO Status.   I have reviewed the Medications.     Review of Systems  Anesthesia Hx:  No problems with previous Anesthesia  Denies Family Hx of Anesthesia complications.   Denies Personal Hx of Anesthesia complications.   Social:  Alcohol Use    Hematology/Oncology:  Hematology Normal   Oncology Normal     EENT/Dental:EENT/Dental Normal   Cardiovascular:  Cardiovascular Normal Exercise tolerance: good     Pulmonary:  Pulmonary Normal    Renal/:  Renal/ Normal     Hepatic/GI:  Hepatic/GI Normal    Musculoskeletal:  Musculoskeletal Normal    Neurological:   Headaches    Endocrine:  Endocrine Normal    Dermatological:  Skin Normal    Psych:   Psychiatric History          Physical Exam  General: Well nourished, Cooperative, Alert and Oriented    Airway:  Mallampati: II / II  Mouth Opening: Normal  TM Distance: Normal  Tongue: Normal  Neck ROM: Normal ROM    Dental:  Intact        Anesthesia Plan  Type of Anesthesia, risks & benefits discussed:    Anesthesia Type: Gen ETT  Intra-op Monitoring Plan: Standard ASA Monitors  Post Op Pain Control Plan: multimodal analgesia  Induction:  IV  Airway Plan: Direct  Informed Consent: Informed consent signed with the Patient and all parties understand the risks and agree with anesthesia plan.  All questions answered. Patient consented to blood products? Yes  ASA Score: 2  Day of Surgery Review of History & Physical: H&P Update referred to the surgeon/provider.I have interviewed and examined the patient. I have reviewed the patient's H&P dated: There are no significant changes.     Ready For Surgery From Anesthesia Perspective.     .

## 2023-09-07 ENCOUNTER — ANESTHESIA (OUTPATIENT)
Dept: SURGERY | Facility: HOSPITAL | Age: 28
End: 2023-09-07
Payer: MEDICAID

## 2023-09-07 ENCOUNTER — HOSPITAL ENCOUNTER (OUTPATIENT)
Facility: HOSPITAL | Age: 28
Discharge: HOME | End: 2023-09-07
Attending: OBSTETRICS & GYNECOLOGY | Admitting: OBSTETRICS & GYNECOLOGY
Payer: MEDICAID

## 2023-09-07 VITALS
SYSTOLIC BLOOD PRESSURE: 101 MMHG | WEIGHT: 130 LBS | TEMPERATURE: 98 F | OXYGEN SATURATION: 100 % | RESPIRATION RATE: 18 BRPM | DIASTOLIC BLOOD PRESSURE: 59 MMHG | HEART RATE: 65 BPM | BODY MASS INDEX: 23.03 KG/M2

## 2023-09-07 DIAGNOSIS — R10.2 PELVIC PAIN: ICD-10-CM

## 2023-09-07 PROCEDURE — 25000003 PHARM REV CODE 250: Performed by: OBSTETRICS & GYNECOLOGY

## 2023-09-07 PROCEDURE — 58660 PR LAP,LYSIS OF ADHESIONS: ICD-10-PCS | Mod: ,,, | Performed by: OBSTETRICS & GYNECOLOGY

## 2023-09-07 PROCEDURE — 25000003 PHARM REV CODE 250: Performed by: NURSE ANESTHETIST, CERTIFIED REGISTERED

## 2023-09-07 PROCEDURE — 63600175 PHARM REV CODE 636 W HCPCS: Performed by: OBSTETRICS & GYNECOLOGY

## 2023-09-07 PROCEDURE — 63600175 PHARM REV CODE 636 W HCPCS: Performed by: NURSE ANESTHETIST, CERTIFIED REGISTERED

## 2023-09-07 PROCEDURE — 71000016 HC POSTOP RECOV ADDL HR: Performed by: OBSTETRICS & GYNECOLOGY

## 2023-09-07 PROCEDURE — 71000033 HC RECOVERY, INTIAL HOUR: Performed by: OBSTETRICS & GYNECOLOGY

## 2023-09-07 PROCEDURE — D9220A PRA ANESTHESIA: Mod: ,,, | Performed by: NURSE ANESTHETIST, CERTIFIED REGISTERED

## 2023-09-07 PROCEDURE — 36000708 HC OR TIME LEV III 1ST 15 MIN: Performed by: OBSTETRICS & GYNECOLOGY

## 2023-09-07 PROCEDURE — D9220A PRA ANESTHESIA: ICD-10-PCS | Mod: ,,, | Performed by: NURSE ANESTHETIST, CERTIFIED REGISTERED

## 2023-09-07 PROCEDURE — 58660 LAPAROSCOPY LYSIS: CPT | Mod: ,,, | Performed by: OBSTETRICS & GYNECOLOGY

## 2023-09-07 PROCEDURE — 37000008 HC ANESTHESIA 1ST 15 MINUTES: Performed by: OBSTETRICS & GYNECOLOGY

## 2023-09-07 PROCEDURE — 36000709 HC OR TIME LEV III EA ADD 15 MIN: Performed by: OBSTETRICS & GYNECOLOGY

## 2023-09-07 PROCEDURE — 27201423 OPTIME MED/SURG SUP & DEVICES STERILE SUPPLY: Performed by: OBSTETRICS & GYNECOLOGY

## 2023-09-07 PROCEDURE — 71000015 HC POSTOP RECOV 1ST HR: Performed by: OBSTETRICS & GYNECOLOGY

## 2023-09-07 PROCEDURE — 37000009 HC ANESTHESIA EA ADD 15 MINS: Performed by: OBSTETRICS & GYNECOLOGY

## 2023-09-07 RX ORDER — DIPHENHYDRAMINE HYDROCHLORIDE 50 MG/ML
25 INJECTION INTRAMUSCULAR; INTRAVENOUS EVERY 4 HOURS PRN
Status: DISCONTINUED | OUTPATIENT
Start: 2023-09-07 | End: 2023-09-07 | Stop reason: HOSPADM

## 2023-09-07 RX ORDER — GLYCOPYRROLATE 0.2 MG/ML
0.2 INJECTION INTRAMUSCULAR; INTRAVENOUS
Status: COMPLETED | OUTPATIENT
Start: 2023-09-07 | End: 2023-09-07

## 2023-09-07 RX ORDER — KETOROLAC TROMETHAMINE 10 MG/1
10 TABLET, FILM COATED ORAL EVERY 6 HOURS
Qty: 12 TABLET | Refills: 0 | Status: SHIPPED | OUTPATIENT
Start: 2023-09-07 | End: 2023-09-10

## 2023-09-07 RX ORDER — HYDROMORPHONE HYDROCHLORIDE 1 MG/ML
1 INJECTION, SOLUTION INTRAMUSCULAR; INTRAVENOUS; SUBCUTANEOUS EVERY 4 HOURS PRN
Status: DISCONTINUED | OUTPATIENT
Start: 2023-09-07 | End: 2023-09-07 | Stop reason: HOSPADM

## 2023-09-07 RX ORDER — ONDANSETRON 4 MG/1
8 TABLET, ORALLY DISINTEGRATING ORAL EVERY 8 HOURS PRN
Status: DISCONTINUED | OUTPATIENT
Start: 2023-09-07 | End: 2023-09-07 | Stop reason: HOSPADM

## 2023-09-07 RX ORDER — SODIUM CHLORIDE, SODIUM LACTATE, POTASSIUM CHLORIDE, CALCIUM CHLORIDE 600; 310; 30; 20 MG/100ML; MG/100ML; MG/100ML; MG/100ML
INJECTION, SOLUTION INTRAVENOUS CONTINUOUS
Status: DISCONTINUED | OUTPATIENT
Start: 2023-09-07 | End: 2023-09-07 | Stop reason: HOSPADM

## 2023-09-07 RX ORDER — VECURONIUM BROMIDE FOR INJECTION 1 MG/ML
INJECTION, POWDER, LYOPHILIZED, FOR SOLUTION INTRAVENOUS
Status: DISCONTINUED | OUTPATIENT
Start: 2023-09-07 | End: 2023-09-07

## 2023-09-07 RX ORDER — HYDROCODONE BITARTRATE AND ACETAMINOPHEN 7.5; 325 MG/1; MG/1
1 TABLET ORAL EVERY 6 HOURS PRN
Qty: 12 TABLET | Refills: 0 | Status: SHIPPED | OUTPATIENT
Start: 2023-09-07

## 2023-09-07 RX ORDER — HYDROCODONE BITARTRATE AND ACETAMINOPHEN 5; 325 MG/1; MG/1
1 TABLET ORAL EVERY 4 HOURS PRN
Status: DISCONTINUED | OUTPATIENT
Start: 2023-09-07 | End: 2023-09-07 | Stop reason: HOSPADM

## 2023-09-07 RX ORDER — DIPHENHYDRAMINE HCL 25 MG
25 CAPSULE ORAL EVERY 4 HOURS PRN
Status: DISCONTINUED | OUTPATIENT
Start: 2023-09-07 | End: 2023-09-07 | Stop reason: HOSPADM

## 2023-09-07 RX ORDER — KETOROLAC TROMETHAMINE 30 MG/ML
INJECTION, SOLUTION INTRAMUSCULAR; INTRAVENOUS
Status: DISCONTINUED | OUTPATIENT
Start: 2023-09-07 | End: 2023-09-07

## 2023-09-07 RX ORDER — PROPOFOL 10 MG/ML
VIAL (ML) INTRAVENOUS
Status: DISCONTINUED | OUTPATIENT
Start: 2023-09-07 | End: 2023-09-07

## 2023-09-07 RX ORDER — ONDANSETRON 2 MG/ML
INJECTION INTRAMUSCULAR; INTRAVENOUS
Status: DISCONTINUED | OUTPATIENT
Start: 2023-09-07 | End: 2023-09-07

## 2023-09-07 RX ORDER — FAMOTIDINE 20 MG/1
20 TABLET, FILM COATED ORAL
Status: COMPLETED | OUTPATIENT
Start: 2023-09-07 | End: 2023-09-07

## 2023-09-07 RX ORDER — FENTANYL CITRATE 50 UG/ML
INJECTION, SOLUTION INTRAMUSCULAR; INTRAVENOUS
Status: DISCONTINUED | OUTPATIENT
Start: 2023-09-07 | End: 2023-09-07

## 2023-09-07 RX ORDER — PROCHLORPERAZINE EDISYLATE 5 MG/ML
5 INJECTION INTRAMUSCULAR; INTRAVENOUS EVERY 6 HOURS PRN
Status: DISCONTINUED | OUTPATIENT
Start: 2023-09-07 | End: 2023-09-07 | Stop reason: HOSPADM

## 2023-09-07 RX ORDER — MIDAZOLAM HYDROCHLORIDE 1 MG/ML
2 INJECTION INTRAMUSCULAR; INTRAVENOUS
Status: COMPLETED | OUTPATIENT
Start: 2023-09-07 | End: 2023-09-07

## 2023-09-07 RX ADMIN — FENTANYL CITRATE 50 MCG: 50 INJECTION, SOLUTION INTRAMUSCULAR; INTRAVENOUS at 12:09

## 2023-09-07 RX ADMIN — VECURONIUM BROMIDE 5 MG: 10 INJECTION, POWDER, FOR SOLUTION INTRAVENOUS at 11:09

## 2023-09-07 RX ADMIN — CEFAZOLIN 2 G: 2 INJECTION, POWDER, FOR SOLUTION INTRAMUSCULAR; INTRAVENOUS at 11:09

## 2023-09-07 RX ADMIN — PROPOFOL 100 MG: 10 INJECTION, EMULSION INTRAVENOUS at 11:09

## 2023-09-07 RX ADMIN — KETOROLAC TROMETHAMINE 30 MG: 30 INJECTION, SOLUTION INTRAMUSCULAR; INTRAVENOUS at 12:09

## 2023-09-07 RX ADMIN — FAMOTIDINE 20 MG: 20 TABLET, FILM COATED ORAL at 08:09

## 2023-09-07 RX ADMIN — SUGAMMADEX 100 MG: 100 INJECTION, SOLUTION INTRAVENOUS at 12:09

## 2023-09-07 RX ADMIN — MIDAZOLAM HYDROCHLORIDE 2 MG: 1 INJECTION, SOLUTION INTRAMUSCULAR; INTRAVENOUS at 11:09

## 2023-09-07 RX ADMIN — HYDROMORPHONE HYDROCHLORIDE 1 MG: 1 INJECTION, SOLUTION INTRAMUSCULAR; INTRAVENOUS; SUBCUTANEOUS at 12:09

## 2023-09-07 RX ADMIN — SODIUM CHLORIDE, POTASSIUM CHLORIDE, SODIUM LACTATE AND CALCIUM CHLORIDE: 600; 310; 30; 20 INJECTION, SOLUTION INTRAVENOUS at 08:09

## 2023-09-07 RX ADMIN — ONDANSETRON 4 MG: 2 INJECTION INTRAMUSCULAR; INTRAVENOUS at 11:09

## 2023-09-07 RX ADMIN — GLYCOPYRROLATE 0.2 MG: 0.2 INJECTION INTRAMUSCULAR; INTRAVENOUS at 08:09

## 2023-09-07 RX ADMIN — FENTANYL CITRATE 100 MCG: 50 INJECTION, SOLUTION INTRAMUSCULAR; INTRAVENOUS at 11:09

## 2023-09-07 NOTE — DISCHARGE INSTRUCTIONS
DECREASED ACTIVITY TODAY, NO HEAVY LIFTING OR STRAINING, NO PUSHING OR PULLING, DON'T OPERATE MACHINERY/VEHICLE IN 24 HOURS, NO DRIVING TODAY OR WHILE TAKING PAIN MEDS, NO INTERCOURSE, DOUCHING, OR TAMPONS, SHOWERS ONLY, NO TUB BATHS, DECREASED ACTIVITY UNTIL AFTER APPOINTMENT.

## 2023-09-07 NOTE — BRIEF OP NOTE
[facility]  Operative Note      Date of Procedure: 9/7/2023     Procedure: Procedure(s) (LRB):  LAPAROSCOPY, DIAGNOSTIC, WITH LASER PROCEDURE (N/A)     Surgeon(s) and Role:     * Ishmael Coleman MD - Primary  Assisting Surgeon: None  Anesthesia: Kevin Quinonez CRNA  Circulator: Lazaro Nguyen RN  Scrub Techs: Pat Julian    Pre-Operative Diagnosis: Pelvic pain [R10.2]    Post-Operative Diagnosis: Post-Op Diagnosis Codes:     * Pelvic pain [R10.2]    Anesthesia: General    Operative Findings (including complications, if any):   Normal ovaries and tubes bilaterally  No evidence of endometriosis  Band of adhesion from upper sigmoid to left tube  Expected scarring along pelvic sidewall from prior TVH    Description of Technical Procedures:     The patient was taken to the operating room on 04/14/2023 where she was placed under general anesthesia by the anesthetist.  Her abdomen and pelvis were then prepped in the usual fashion for diagnostic laparoscopy.  A kitchen was placed by the circulator.  She was then draped by the scrub tech.      A bivalved speculum was placed in the patient's vagina and opened.  The patient has history of TVH and has no cervix, so a sponge stick was inserted into the vagina for manipulation.    Gloves were changed and attention as then turned to the abdomen.    A 5mm infraumbilical incision was made with an 11 blade.  The abdominal wall was then tented.  A Veress needle was inserted and found to be intraperitoneal.  The abdomen was insufflated to approximately 3 L of CO2, and the Veress needle was then removed.  The 5 mm trocar with camera was inserted without difficulties.  The patient was placed in steep Trendelenburg.  A 2nd 5 mm incision was made suprapubically and a 5mm trocar was inserted through this incision.  Using the probe the bowels were displaced from the posterior cul-de-sac and pelvic survey was made.      The uterus had been  removed. The pelvic serosal surfaces were free of endometriosis.  There was expected scarring of the left pelvic sidewall between the adnexal and cuff consistent with prior TVH. Both tubes and ovaries appeared normal, free of cysts and endometriosis.  The anterior cul de sac appears normal.  The posterior cul de sac also appeared normal but there was small fenestration noted just to the right and posterior to the vaginal cuff.  There was a band of adhesion from the upper sigmoid to the left tube.  The band was cauterized and removed with the scissors. Pictures were made.      She was taken out of Trendelenburg, the suprapubic trocar was then removed and no bleeding was noted from this site.  The abdomen was desufflated.  The infraumbilical trocar was removed.  The skin incisions were bovied as needed and closed with 3-0 Monocryl with a figure-of-eight.  Skin Glue was then applied.     Attention was then turned to the pelvis, where the sponge stick was removed.     Sponge, laps, needles, and instruments were counted and correct times two.    The patient was then awakened from anesthesia, taken down from dorsal lithotomy position, and taken to recovery room in stable condition.      Significant Surgical Tasks Conducted by the Assistant(s), if Applicable: N/A    Estimated Blood Loss (EBL): * No values recorded between 9/7/2023 12:04 PM and 9/7/2023 12:31 PM * Minimal           Implants: * No implants in log *    Specimens: None    Specimen (24h ago, onward)      None                    Condition: Good    Disposition: PACU - hemodynamically stable.    Attestation: I performed the procedure.    Discharge Note    OUTCOME: Patient tolerated treatment/procedure well without complication and is now ready for discharge.    DISPOSITION: Home or Self Care    FINAL DIAGNOSIS:  <principal problem not specified>    FOLLOWUP: In clinic    DISCHARGE INSTRUCTIONS:  Discharge instructions given to the patient by the out patient  nurses.

## 2023-09-07 NOTE — ANESTHESIA PROCEDURE NOTES
Intubation    Date/Time: 9/7/2023 11:38 AM    Performed by: Kevin Quinonez CRNA  Authorized by: Kevin Quinonez CRNA    Intubation:     Induction:  Intravenous    Intubated:  Postinduction    Mask Ventilation:  Easy mask    Attempts:  1    Attempted By:  CRNA    Method of Intubation:  Direct    Blade:  Espinoza 2    Laryngeal View Grade: Grade I - full view of cords      Difficult Airway Encountered?: No      Airway Device:  Oral endotracheal tube    Airway Device Size:  7.0    Style/Cuff Inflation:  Cuffed    Tube secured:  20    Secured at:  The lips    Placement Verified By:  Capnometry    Complicating Factors:  None    Findings Post-Intubation:  BS equal bilateral and atraumatic/condition of teeth unchanged

## 2023-09-07 NOTE — ANESTHESIA POSTPROCEDURE EVALUATION
Anesthesia Post Evaluation    Patient: Mena Canas    Procedure(s) Performed: Procedure(s) (LRB):  LAPAROSCOPY, DIAGNOSTIC, WITH LASER PROCEDURE (N/A)    Final Anesthesia Type: general      Patient location during evaluation: PACU  Patient participation: Yes- Able to Participate  Level of consciousness: awake and alert, awake and oriented  Post-procedure vital signs: reviewed and stable  Pain management: adequate  Airway patency: patent    PONV status at discharge: No PONV  Anesthetic complications: no      Cardiovascular status: blood pressure returned to baseline  Respiratory status: unassisted, room air and spontaneous ventilation  Hydration status: euvolemic  Follow-up not needed.          Vitals Value Taken Time   BP 98/60 09/07/23 1235   Temp 36.7 °C (98.1 °F) 09/07/23 1232   Pulse 70 09/07/23 1236   Resp 18 09/07/23 1232   SpO2 100 % 09/07/23 1236   Vitals shown include unvalidated device data.      No case tracking events are documented in the log.      Pain/Little Score: No data recorded

## 2023-09-07 NOTE — PLAN OF CARE
PT IS BACK TO ROOM, AWAKE AND ALERT, IN STABLE CONDITION, NO DIFFICULTY BREATHING OR SWALLOWING. PT IS TOLERATING LIQUIDS, FAMILY AT SIDE, SR X2, CB IN REACH

## 2023-09-08 NOTE — INTERVAL H&P NOTE
The patient has been examined and the H&P has been reviewed:    I concur with the findings and no changes have occurred since H&P was written.    Anesthesia/Surgery risks, benefits and alternative options discussed and understood by patient/family.      Active Hospital Problems    Diagnosis  POA    Pelvic pain [R10.2]  Yes      Resolved Hospital Problems   No resolved problems to display.

## 2023-09-21 ENCOUNTER — OFFICE VISIT (OUTPATIENT)
Dept: OBSTETRICS AND GYNECOLOGY | Facility: CLINIC | Age: 28
End: 2023-09-21
Payer: MEDICAID

## 2023-09-21 VITALS
DIASTOLIC BLOOD PRESSURE: 68 MMHG | RESPIRATION RATE: 18 BRPM | SYSTOLIC BLOOD PRESSURE: 108 MMHG | HEART RATE: 68 BPM | WEIGHT: 131.63 LBS | HEIGHT: 62 IN | BODY MASS INDEX: 24.22 KG/M2

## 2023-09-21 DIAGNOSIS — Z09 POSTOP CHECK: Primary | ICD-10-CM

## 2023-09-21 PROCEDURE — 3008F PR BODY MASS INDEX (BMI) DOCUMENTED: ICD-10-PCS | Mod: CPTII,,, | Performed by: OBSTETRICS & GYNECOLOGY

## 2023-09-21 PROCEDURE — 99024 PR POST-OP FOLLOW-UP VISIT: ICD-10-PCS | Mod: ,,, | Performed by: OBSTETRICS & GYNECOLOGY

## 2023-09-21 PROCEDURE — 3078F PR MOST RECENT DIASTOLIC BLOOD PRESSURE < 80 MM HG: ICD-10-PCS | Mod: CPTII,,, | Performed by: OBSTETRICS & GYNECOLOGY

## 2023-09-21 PROCEDURE — 3078F DIAST BP <80 MM HG: CPT | Mod: CPTII,,, | Performed by: OBSTETRICS & GYNECOLOGY

## 2023-09-21 PROCEDURE — 3008F BODY MASS INDEX DOCD: CPT | Mod: CPTII,,, | Performed by: OBSTETRICS & GYNECOLOGY

## 2023-09-21 PROCEDURE — 1159F PR MEDICATION LIST DOCUMENTED IN MEDICAL RECORD: ICD-10-PCS | Mod: CPTII,,, | Performed by: OBSTETRICS & GYNECOLOGY

## 2023-09-21 PROCEDURE — 3074F PR MOST RECENT SYSTOLIC BLOOD PRESSURE < 130 MM HG: ICD-10-PCS | Mod: CPTII,,, | Performed by: OBSTETRICS & GYNECOLOGY

## 2023-09-21 PROCEDURE — 1160F RVW MEDS BY RX/DR IN RCRD: CPT | Mod: CPTII,,, | Performed by: OBSTETRICS & GYNECOLOGY

## 2023-09-21 PROCEDURE — 3074F SYST BP LT 130 MM HG: CPT | Mod: CPTII,,, | Performed by: OBSTETRICS & GYNECOLOGY

## 2023-09-21 PROCEDURE — 1160F PR REVIEW ALL MEDS BY PRESCRIBER/CLIN PHARMACIST DOCUMENTED: ICD-10-PCS | Mod: CPTII,,, | Performed by: OBSTETRICS & GYNECOLOGY

## 2023-09-21 PROCEDURE — 99024 POSTOP FOLLOW-UP VISIT: CPT | Mod: ,,, | Performed by: OBSTETRICS & GYNECOLOGY

## 2023-09-21 PROCEDURE — 1159F MED LIST DOCD IN RCRD: CPT | Mod: CPTII,,, | Performed by: OBSTETRICS & GYNECOLOGY

## 2023-09-21 NOTE — PROGRESS NOTES
Subjective:      Mena Canas is a 28 y.o.  who presents to the clinic 2 weeks status post diagnostic laparoscopy. The patient is not having any pain. Post-op Evaluation (Presents to clinic for 2 week post op exploratory lap. No c/o voiced. )  FINDINGS AT SURGERY WERE D/W WITH THE PATIENT.  THERE WAS A SINGLE SMALL BAND OF ADHESION FROM THE SIGMOID TO THE LEFT ADNEXA WHICH WAS TAKEN DOWN, THERE WAS NO ENDOMETRIOSIS.    Past Medical History:   Diagnosis Date    ADHD     Endometriosis, unspecified     Fibroids     Migraines     Other constipation      Past Surgical History:   Procedure Laterality Date    24 HOUR IMPEDANCE PH MONITORING OF ESOPHAGUS IN PATIENT NOT TAKING ACID REDUCING MEDICATIONS N/A 2022    Procedure: IMPEDANCE PH STUDY, ESOPHAGEAL, 24 HOUR, IN PATIENT NOT TAKING ACID REDUCING MEDICATION;  Surgeon: Christen Velasquez MD;  Location: [facility] ([address] [place] [address]);  Service: Endoscopy;  Laterality: N/A;    DIAGNOSTIC LAPAROSCOPY N/A 2023    Procedure: LAPAROSCOPY, DIAGNOSTIC;  Surgeon: Ishmael Coleman MD;  Location: [facility] OR;  Service: OB/GYN;  Laterality: N/A;    ENDOMETRIAL ABLATION      ESOPHAGEAL MANOMETRY WITH MEASUREMENT OF IMPEDANCE N/A 2022    Procedure: MANOMETRY, ESOPHAGUS, WITH IMPEDANCE MEASUREMENT;  Surgeon: Christen Velasquez MD;  Location: [facility] ([address] [place] [address]);  Service: Endoscopy;  Laterality: N/A;  outside referral to Dr. Velasquez from Dr. Jhon Rosen-see media tab 22  Per Dr. Jhon Rosen patient to be ON PPI   instructions emailed to [email or web address]    TUBAL LIGATION      TUBES IN EARS      AS CHILD    VAGINAL HYSTERECTOMY      WITH OUT BS&O    WISDOM TOOTH EXTRACTION       Review of patient's allergies indicates:   Allergen Reactions    Tobramycin Swelling and Rash     OB History    Para Term  AB Living   2 2 2     2   SAB IAB Ectopic Multiple Live Births           2      # Outcome Date GA Lbr Kartik/2nd Weight Sex Delivery Anes PTL Lv   2 Term  "11/15/16   4.02 kg (8 lb 13.8 oz) M Vag-Spont None  KARSON   1 Term 08/28/12   3.629 kg (8 lb) M Vag-Spont   KARSON     Social History     Tobacco Use    Smoking status: Never     Passive exposure: Never    Smokeless tobacco: Never   Substance Use Topics    Alcohol use: Yes     Comment: RARELY    Drug use: Never     Family History   Problem Relation Age of Onset    Atrial fibrillation Mother        Current Outpatient Medications:     ondansetron (ZOFRAN-ODT) 8 MG TbDL, Take by mouth., Disp: , Rfl:     promethazine (PHENERGAN) 25 MG tablet, Take 25 mg by mouth 2 (two) times daily as needed., Disp: , Rfl:     rizatriptan (MAXALT) 10 MG tablet, Take by mouth., Disp: , Rfl:     HYDROcodone-acetaminophen (NORCO) 7.5-325 mg per tablet, Take 1 tablet by mouth every 6 (six) hours as needed for Pain. (Patient not taking: Reported on 9/21/2023), Disp: 12 tablet, Rfl: 0    lisdexamfetamine (VYVANSE) 70 MG capsule, Take 70 mg by mouth every morning., Disp: , Rfl:     Current Facility-Administered Medications:     famotidine tablet 20 mg, 20 mg, Oral, On Call Procedure, Ishmael Coleman MD    A comprehensive review of symptoms was completed and negative except as noted above.  Objective:   /68 (BP Location: Left arm)   Pulse 68   Resp 18   Ht 5' 2" (1.575 m)   Wt 59.7 kg (131 lb 9.6 oz)   BMI 24.07 kg/m²   General Appearance: alert, in no acute distress, normal, well nourished.  Breast:  Right: Inspection/palpation: no discharge, no masses present, no nipple retraction, no skin changes, no skin dimpling, no tenderness, no lymphadenopathy, no axillary mass, no axillary tenderness.  Left: Inspection/palpation: no discharge, no masses present, no nipple retraction, no skin changes, no skin dimpling, no tenderness, no lymphadenopathy, no axillary mass, no axillary tenderness.  Gastrointestinal:  Abdomen: no masses. no tender, nondistended. INCISIONS HEALING WELL.  Liver and spleen: normal  Hernias: no hernias present, no " inguinal adenopathy.  Wound Site: clean, dry and intact without erythema or induration.   Assessment:   Postop check       No results found for this or any previous visit (from the past 24 hour(s)).    Plan:   Continue any current medications.  Wound care discussed.  Follow up Follow up if symptoms worsen or fail to improve. In addition to her scheduled follow up, the pt has also been instructed to follow up on as needed basis.

## 2024-07-30 ENCOUNTER — OFFICE VISIT (OUTPATIENT)
Dept: OBSTETRICS AND GYNECOLOGY | Facility: CLINIC | Age: 29
End: 2024-07-30
Payer: MEDICAID

## 2024-07-30 VITALS
HEART RATE: 86 BPM | SYSTOLIC BLOOD PRESSURE: 102 MMHG | DIASTOLIC BLOOD PRESSURE: 64 MMHG | OXYGEN SATURATION: 99 % | WEIGHT: 132.88 LBS | RESPIRATION RATE: 18 BRPM | HEIGHT: 63 IN | BODY MASS INDEX: 23.54 KG/M2

## 2024-07-30 DIAGNOSIS — Z90.710 ABSENCE OF CERVIX, ACQUIRED: ICD-10-CM

## 2024-07-30 DIAGNOSIS — Z01.419 WOMEN'S ANNUAL ROUTINE GYNECOLOGICAL EXAMINATION: Primary | ICD-10-CM

## 2024-07-30 PROCEDURE — 3074F SYST BP LT 130 MM HG: CPT | Mod: CPTII,,, | Performed by: OBSTETRICS & GYNECOLOGY

## 2024-07-30 PROCEDURE — 99395 PREV VISIT EST AGE 18-39: CPT | Mod: ,,, | Performed by: OBSTETRICS & GYNECOLOGY

## 2024-07-30 PROCEDURE — 1159F MED LIST DOCD IN RCRD: CPT | Mod: CPTII,,, | Performed by: OBSTETRICS & GYNECOLOGY

## 2024-07-30 PROCEDURE — 1160F RVW MEDS BY RX/DR IN RCRD: CPT | Mod: CPTII,,, | Performed by: OBSTETRICS & GYNECOLOGY

## 2024-07-30 PROCEDURE — 3078F DIAST BP <80 MM HG: CPT | Mod: CPTII,,, | Performed by: OBSTETRICS & GYNECOLOGY

## 2024-07-30 PROCEDURE — 3008F BODY MASS INDEX DOCD: CPT | Mod: CPTII,,, | Performed by: OBSTETRICS & GYNECOLOGY

## 2024-07-30 NOTE — PROGRESS NOTES
Patient ID: [id number]   Chief Complaint: Annual exam  Chief Complaint   Patient presents with    Well Woman     No c/o's.     HPI:   Mena Canas is a 29 y.o. year old  here for her Annual Exam.   No LMP recorded. Patient has had a hysterectomy.   She is doing well. Denies any health changes.   Well Woman (No c/o's.)    Subjective:     Past Medical History:   Diagnosis Date    ADHD     Endometriosis, unspecified     Fibroids     Fracture of right foot     Migraines     Other constipation      Past Surgical History:   Procedure Laterality Date    24 HOUR IMPEDANCE PH MONITORING OF ESOPHAGUS IN PATIENT NOT TAKING ACID REDUCING MEDICATIONS N/A 2022    Procedure: IMPEDANCE PH STUDY, ESOPHAGEAL, 24 HOUR, IN PATIENT NOT TAKING ACID REDUCING MEDICATION;  Surgeon: Christen Velasquez MD;  Location: [facility] ([address] [place] [address]);  Service: Endoscopy;  Laterality: N/A;    DIAGNOSTIC LAPAROSCOPY N/A 2023    Procedure: LAPAROSCOPY, DIAGNOSTIC;  Surgeon: Ishmael Coleman MD;  Location: [facility] OR;  Service: OB/GYN;  Laterality: N/A;    ENDOMETRIAL ABLATION      ESOPHAGEAL MANOMETRY WITH MEASUREMENT OF IMPEDANCE N/A 2022    Procedure: MANOMETRY, ESOPHAGUS, WITH IMPEDANCE MEASUREMENT;  Surgeon: Christen Velasquez MD;  Location: [facility] ([address] [place] [address]);  Service: Endoscopy;  Laterality: N/A;  outside referral to Dr. Velasquez from Dr. Jhon Rosen-see media tab 22  Per Dr. Jhon Rosen patient to be ON PPI   instructions emailed to [email or web address]    TUBAL LIGATION      TUBES IN EARS      AS CHILD    VAGINAL HYSTERECTOMY      WITH OUT BS&O    WISDOM TOOTH EXTRACTION       Social History     Tobacco Use    Smoking status: Never     Passive exposure: Never    Smokeless tobacco: Never   Substance Use Topics    Alcohol use: Yes     Comment: RARELY    Drug use: Never     Family History   Problem Relation Name Age of Onset    Atrial fibrillation Mother       OB History    Para Term  AB Living   2 2 2      "2   SAB IAB Ectopic Multiple Live Births           2      # Outcome Date GA Lbr Kartik/2nd Weight Sex Type Anes PTL Lv   2 Term 11/15/16   4.02 kg (8 lb 13.8 oz) M Vag-Spont None  KARSON   1 Term 08/28/12   3.629 kg (8 lb) M Vag-Spont   KARSON       Current Outpatient Medications:     lisdexamfetamine (VYVANSE) 70 MG capsule, Take 70 mg by mouth every morning., Disp: , Rfl:     ondansetron (ZOFRAN-ODT) 8 MG TbDL, Take by mouth., Disp: , Rfl:     rizatriptan (MAXALT) 10 MG tablet, Take by mouth., Disp: , Rfl:     Current Facility-Administered Medications:     famotidine tablet 20 mg, 20 mg, Oral, On Call Procedure, Ishmael Coleman MD  MENARCHEAL:  hysterectomy  PAP:  Last PAP: 7/2/2020    History of Abnormal PAP Smear: NO  Treated: n/a  HPV Vaccine: YES     INTERCOURSE:  Dyspareunia: No  Postcoital Bleeding: No  History of STI: No   If yes, then: No   Current Birth Control Method:  tubal ligation AND TVH W/O BSO   Sexually Active: yes    Review of Systems 12 point review of systems conducted, negative except as stated in the history of present illness.     See HPI for details.  Objective:   Visit Vitals  /64 (BP Location: Left arm)   Pulse 86   Resp 18   Ht 5' 3" (1.6 m)   Wt 60.3 kg (132 lb 14.4 oz)   SpO2 99%   BMI 23.54 kg/m²     No results found for this or any previous visit (from the past 24 hour(s)).  Physical Exam:  Chaperone present for exam.  Physical Exam  Constitutional:  General Appearance : alert, in no acute distress, normal, well nourished.  Cardiovascular:   Regular rate and rhythm.  Respiratory:  Respiratory Effort: normal.  Breast:  Right: Inspection/palpation: no discharge, no masses present, no nipple retraction, no skin changes, no skin dimpling, no tenderness, no lymphadenopathy, no axillary mass, no axillary tenderness.  Left: Inspection/palpation: no discharge, no masses present, no nipple retraction, no skin changes, no skin dimpling, no tenderness, no lymphadenopathy, no axillary mass, no " axillary tenderness.  Gastrointestinal:  Abdomen: no masses. no tender, nondistended.  Genitourinary:  External Genitalia: normal, no lesions.  Vagina: normal appearance, no abnormal discharge, no lesions.  Bladder: no mass, nontender.  Urethra: no erythema or lesions present.  Cervix: SURGICALLY ABSENT  Uterus:SURGICALLY ABSENT  Adnexa: NT, ND, NO MASSES.  Anus and Perineum: visually normal.     No results found for this or any previous visit (from the past 24 hour(s)).  Assessment/Plan:   Assessment:   Women's annual routine gynecological examination    Absence of cervix, acquired      Follow up in about 1 year (around 7/30/2025) for ANNUAL.     In addition to their scheduled follow-up, the patient has also been instructed to follow up on as needed basis.   All questions were answered and the patient voiced understanding of the above issues.

## (undated) DEVICE — GOWN SMARTGOWN 3XL XLONG

## (undated) DEVICE — Device

## (undated) DEVICE — BLADE SURG CARBON STEEL SZ11

## (undated) DEVICE — SCISSOR 5MMX35CM DIRECT DRIVE

## (undated) DEVICE — CANNULA LAP SEAL Z THRD 5X100

## (undated) DEVICE — NDL INSUFFLATION VERRES 120MM

## (undated) DEVICE — DISSECTOR EPIX LAPA 5MMX35CM

## (undated) DEVICE — SUT 4-0 VICRYL / FS-2

## (undated) DEVICE — TROCAR ENDO Z THREAD KII 5X100

## (undated) DEVICE — TRAY DRY SKIN SCRUB PREP

## (undated) DEVICE — DRAPE LEGGINGS CUFF 33X51IN

## (undated) DEVICE — ADHESIVE DERMABOND ADVANCED

## (undated) DEVICE — TRAY CATH URETHRAL FOLEY 16FR

## (undated) DEVICE — DRAPE UND BUTT W/POUCH 40X44IN

## (undated) DEVICE — MANIPULATOR UTERINE

## (undated) DEVICE — GLOVE PROTEXIS PI SYN SURG 8.0